# Patient Record
Sex: MALE | Race: WHITE | NOT HISPANIC OR LATINO | Employment: FULL TIME | URBAN - METROPOLITAN AREA
[De-identification: names, ages, dates, MRNs, and addresses within clinical notes are randomized per-mention and may not be internally consistent; named-entity substitution may affect disease eponyms.]

---

## 2018-05-09 ENCOUNTER — OFFICE VISIT (OUTPATIENT)
Dept: CARDIOLOGY | Facility: CLINIC | Age: 43
End: 2018-05-09
Payer: MEDICAID

## 2018-05-09 VITALS
WEIGHT: 180 LBS | HEART RATE: 74 BPM | BODY MASS INDEX: 24.38 KG/M2 | HEIGHT: 72 IN | SYSTOLIC BLOOD PRESSURE: 133 MMHG | DIASTOLIC BLOOD PRESSURE: 86 MMHG

## 2018-05-09 DIAGNOSIS — R00.2 HEART PALPITATIONS: Primary | ICD-10-CM

## 2018-05-09 DIAGNOSIS — J45.20 MILD INTERMITTENT ASTHMA WITHOUT COMPLICATION: ICD-10-CM

## 2018-05-09 PROCEDURE — 93000 ELECTROCARDIOGRAM COMPLETE: CPT | Mod: S$GLB,,, | Performed by: INTERNAL MEDICINE

## 2018-05-09 PROCEDURE — 93227 XTRNL ECG REC<48 HR R&I: CPT | Mod: S$GLB,,, | Performed by: INTERNAL MEDICINE

## 2018-05-09 PROCEDURE — 99204 OFFICE O/P NEW MOD 45 MIN: CPT | Mod: S$GLB,,, | Performed by: INTERNAL MEDICINE

## 2018-05-09 NOTE — PROGRESS NOTES
"  Subjective:      Patient ID: Arnaud Cox is a 43 y.o. male.    Chief Complaint: Irregular Heart Beat (C/O racing heart rate)    HPI:  "I think I have a fib."  Episodically pt has been noting a fast heart beat.  Pt used to have a fast heart beat once a week.  The fast heart beats have been occurring for many years (?possible onset in his 209's) but has been occurring much less frequently over the past 6 months.  The one episode of a rapid heart beat over the last 6 months occurred 3 weeks ago and lasted 3 minutes and occurred while mowing grass.  Sometimes pt has been able to stop the rapid heart beat by holding his breath.    Dr Jovany Martines is PCP and [facility]    Pt was definitely having palpitations 7 years ago when cutting Dr Danielito Yan's yard.    Pt is quite active with physical labor and is not limited in any way      Review of Systems   Cardiovascular: Positive for palpitations. Negative for chest pain, claudication, dyspnea on exertion, irregular heartbeat, leg swelling, near-syncope, orthopnea and syncope.      Pt needs clearance for dental work.    Last physical exam age 26.        Past Medical History:   Diagnosis Date    Asthma         History reviewed. No pertinent surgical history.    Family History   Problem Relation Age of Onset    Anemia Mother     Diabetes Father     Hyperlipidemia Father     No Known Problems Brother        Social History     Social History    Marital status: Unknown     Spouse name: N/A    Number of children: N/A    Years of education: N/A     Social History Main Topics    Smoking status: Never Smoker    Smokeless tobacco: Never Used    Alcohol use 0.6 oz/week     1 Cans of beer per week    Drug use: No    Sexual activity: Not Asked     Other Topics Concern    None     Social History Narrative    None       No current outpatient prescriptions on file prior to visit.     No current facility-administered medications on file prior to visit.  " "      Review of patient's allergies indicates:  No Known Allergies  Objective:     Vitals:    05/09/18 0856   BP: 133/86   BP Location: Left arm   Patient Position: Sitting   BP Method: Medium (Automatic)   Pulse: 74   Weight: 81.6 kg (180 lb)   Height: 5' 11.5" (1.816 m)        Physical Exam   Constitutional: He is oriented to person, place, and time. He appears well-developed and well-nourished. No distress.   Eyes: No scleral icterus.   Neck: No JVD present. Carotid bruit is not present.   Cardiovascular: Regular rhythm and normal heart sounds.  Exam reveals no gallop and no friction rub.    No murmur heard.  Pulses:       Posterior tibial pulses are 2+ on the right side, and 2+ on the left side.   Pulmonary/Chest: Effort normal and breath sounds normal. No respiratory distress.   Abdominal: Soft. There is no hepatosplenomegaly. There is no tenderness.   Musculoskeletal: He exhibits no edema.   Neurological: He is alert and oriented to person, place, and time.   Skin: Skin is warm and dry. He is not diaphoretic.   Psychiatric: He has a normal mood and affect. His behavior is normal. Judgment and thought content normal.   Vitals reviewed.     ECG: NSR, WNL  Assessment:     1. Heart palpitations    2. Mild intermittent asthma without complication        Palpitations are most likely due to paroxysmal atrial tachycardia.    Plan:   Arnaud was seen today for irregular heart beat.    Diagnoses and all orders for this visit:    Heart palpitations  -     2D echo with color flow doppler; Future  -     Holter monitor - 24 hour; Future  -     EKG 12-lead    Mild intermittent asthma without complication     Patient is medically stable for dental work  No need for antibiotic prophylaxis  OK to use epinephrine if necessary    24 hr holter monitor    Echocardiogram with doppler    TSH level    Palpitations are so infrequent that it is unnecessary to begin verapamil or metoprolol to empirically suppress the arrhythmia.  " Discussed avoidance of caffeine and decongestants.  Discussed role of radiofrequency ablation if diagnosis of paroxysmal atrial tachycardia is confirmed.    Follow-up in about 6 months (around 11/9/2018).

## 2018-05-17 ENCOUNTER — HOSPITAL ENCOUNTER (OUTPATIENT)
Dept: CARDIOLOGY | Facility: OTHER | Age: 43
Discharge: HOME | End: 2018-05-17
Attending: INTERNAL MEDICINE
Payer: MEDICAID

## 2018-05-17 DIAGNOSIS — R00.2 HEART PALPITATIONS: ICD-10-CM

## 2018-05-17 PROCEDURE — 93306 TTE W/DOPPLER COMPLETE: CPT | Mod: 26,,, | Performed by: INTERNAL MEDICINE

## 2018-05-17 PROCEDURE — 93306 TTE W/DOPPLER COMPLETE: CPT

## 2018-05-18 ENCOUNTER — TELEPHONE (OUTPATIENT)
Dept: CARDIOLOGY | Facility: CLINIC | Age: 43
End: 2018-05-18

## 2018-05-18 LAB
DIASTOLIC DYSFUNCTION: NO
ESTIMATED PA SYSTOLIC PRESSURE: 28.61
MITRAL VALVE REGURGITATION: NORMAL
RETIRED EF AND QEF - SEE NOTES: 65 (ref 55–65)
TRICUSPID VALVE REGURGITATION: NORMAL

## 2018-05-23 ENCOUNTER — TELEPHONE (OUTPATIENT)
Dept: CARDIOLOGY | Facility: CLINIC | Age: 43
End: 2018-05-23

## 2018-05-23 NOTE — TELEPHONE ENCOUNTER
Holter monitor is normal--rare PAC's and PVC's are normal findings.  No treatment needed. Pt reassured.  If palpitations recur will consider 30 day event monitor or possibly even in implanted loop recorder if pt feels like he is having sustained fast heart beats.

## 2018-05-31 DIAGNOSIS — R00.2 HEART PALPITATIONS: ICD-10-CM
